# Patient Record
Sex: FEMALE | Race: WHITE | NOT HISPANIC OR LATINO | URBAN - METROPOLITAN AREA
[De-identification: names, ages, dates, MRNs, and addresses within clinical notes are randomized per-mention and may not be internally consistent; named-entity substitution may affect disease eponyms.]

---

## 2018-08-24 ENCOUNTER — HOME CARE/HOSPICE - HEALTHEAST (OUTPATIENT)
Dept: HOME HEALTH SERVICES | Facility: HOME HEALTH | Age: 58
End: 2018-08-24

## 2018-09-14 ENCOUNTER — AMBULATORY - HEALTHEAST (OUTPATIENT)
Dept: CARDIOLOGY | Facility: CLINIC | Age: 58
End: 2018-09-14

## 2018-09-14 ENCOUNTER — RECORDS - HEALTHEAST (OUTPATIENT)
Dept: ADMINISTRATIVE | Facility: OTHER | Age: 58
End: 2018-09-14

## 2018-09-21 ENCOUNTER — OFFICE VISIT - HEALTHEAST (OUTPATIENT)
Dept: CARDIOLOGY | Facility: CLINIC | Age: 58
End: 2018-09-21

## 2018-09-21 DIAGNOSIS — I25.118 CORONARY ARTERY DISEASE OF NATIVE ARTERY OF NATIVE HEART WITH STABLE ANGINA PECTORIS (H): ICD-10-CM

## 2018-09-21 DIAGNOSIS — I65.22 OCCLUSION AND STENOSIS OF LEFT CAROTID ARTERY: ICD-10-CM

## 2018-09-21 DIAGNOSIS — I67.9 CEREBROVASCULAR DISEASE: ICD-10-CM

## 2018-09-21 RX ORDER — BACLOFEN 10 MG/1
5-10 TABLET ORAL 2 TIMES DAILY PRN
Status: SHIPPED | COMMUNITY
Start: 2018-09-21

## 2018-09-21 ASSESSMENT — MIFFLIN-ST. JEOR: SCORE: 1063.03

## 2018-10-05 ENCOUNTER — COMMUNICATION - HEALTHEAST (OUTPATIENT)
Dept: VASCULAR SURGERY | Facility: CLINIC | Age: 58
End: 2018-10-05

## 2018-10-31 ENCOUNTER — COMMUNICATION - HEALTHEAST (OUTPATIENT)
Dept: CARDIOLOGY | Facility: CLINIC | Age: 58
End: 2018-10-31

## 2018-11-01 ENCOUNTER — HOSPITAL ENCOUNTER (OUTPATIENT)
Dept: CARDIOLOGY | Facility: CLINIC | Age: 58
Discharge: HOME | End: 2018-11-01
Attending: INTERNAL MEDICINE

## 2018-11-07 ENCOUNTER — RECORDS - HEALTHEAST (OUTPATIENT)
Dept: VASCULAR ULTRASOUND | Facility: CLINIC | Age: 58
End: 2018-11-07

## 2018-11-07 DIAGNOSIS — I65.22 OCCLUSION AND STENOSIS OF LEFT CAROTID ARTERY: ICD-10-CM

## 2018-11-07 DIAGNOSIS — I67.9 CEREBROVASCULAR DISEASE, UNSPECIFIED: ICD-10-CM

## 2019-01-09 ENCOUNTER — COMMUNICATION - HEALTHEAST (OUTPATIENT)
Dept: CARDIOLOGY | Facility: CLINIC | Age: 59
End: 2019-01-09

## 2019-01-09 DIAGNOSIS — R07.9 CHEST PAIN, UNSPECIFIED: ICD-10-CM

## 2019-01-23 ENCOUNTER — HOSPITAL ENCOUNTER (OUTPATIENT)
Dept: CARDIOLOGY | Facility: CLINIC | Age: 59
Discharge: HOME | End: 2019-01-23
Attending: INTERNAL MEDICINE

## 2019-01-23 ENCOUNTER — HOSPITAL ENCOUNTER (OUTPATIENT)
Dept: NUCLEAR MEDICINE | Facility: CLINIC | Age: 59
Discharge: HOME | End: 2019-01-23
Attending: INTERNAL MEDICINE

## 2019-01-23 DIAGNOSIS — I25.118 CORONARY ARTERY DISEASE OF NATIVE ARTERY OF NATIVE HEART WITH STABLE ANGINA PECTORIS (H): ICD-10-CM

## 2019-02-07 ENCOUNTER — COMMUNICATION - HEALTHEAST (OUTPATIENT)
Dept: ADMINISTRATIVE | Facility: CLINIC | Age: 59
End: 2019-02-07

## 2019-02-21 ENCOUNTER — COMMUNICATION - HEALTHEAST (OUTPATIENT)
Dept: CARDIOLOGY | Facility: CLINIC | Age: 59
End: 2019-02-21

## 2019-04-02 ENCOUNTER — COMMUNICATION - HEALTHEAST (OUTPATIENT)
Dept: CARDIOLOGY | Facility: CLINIC | Age: 59
End: 2019-04-02

## 2019-04-02 DIAGNOSIS — R07.1 CHEST PAIN ON BREATHING: ICD-10-CM

## 2019-04-02 DIAGNOSIS — I25.10 CAD (CORONARY ARTERY DISEASE): ICD-10-CM

## 2019-04-23 ENCOUNTER — HOSPITAL ENCOUNTER (OUTPATIENT)
Dept: CARDIOLOGY | Facility: HOSPITAL | Age: 59
Discharge: HOME | End: 2019-04-23
Attending: INTERNAL MEDICINE

## 2019-04-23 DIAGNOSIS — R07.1 CHEST PAIN ON BREATHING: ICD-10-CM

## 2019-04-23 DIAGNOSIS — I25.10 CAD (CORONARY ARTERY DISEASE): ICD-10-CM

## 2019-04-23 LAB
CV STRESS CURRENT BP HE: NORMAL
CV STRESS CURRENT HR HE: 102
CV STRESS CURRENT HR HE: 110
CV STRESS CURRENT HR HE: 110
CV STRESS CURRENT HR HE: 112
CV STRESS CURRENT HR HE: 114
CV STRESS CURRENT HR HE: 115
CV STRESS CURRENT HR HE: 116
CV STRESS CURRENT HR HE: 76
CV STRESS CURRENT HR HE: 78
CV STRESS CURRENT HR HE: 79
CV STRESS CURRENT HR HE: 79
CV STRESS CURRENT HR HE: 80
CV STRESS CURRENT HR HE: 83
CV STRESS CURRENT HR HE: 85
CV STRESS CURRENT HR HE: 86
CV STRESS CURRENT HR HE: 86
CV STRESS CURRENT HR HE: 91
CV STRESS CURRENT HR HE: 92
CV STRESS DEVIATION TIME HE: NORMAL
CV STRESS ECHO PERCENT HR HE: NORMAL
CV STRESS EXERCISE STAGE HE: NORMAL
CV STRESS FINAL RESTING BP HE: NORMAL
CV STRESS FINAL RESTING HR HE: 79
CV STRESS MAX HR HE: 117
CV STRESS MAX TREADMILL GRADE HE: 12
CV STRESS MAX TREADMILL SPEED HE: 2.5
CV STRESS PEAK DIA BP HE: NORMAL
CV STRESS PEAK SYS BP HE: NORMAL
CV STRESS PHASE HE: NORMAL
CV STRESS PROTOCOL HE: NORMAL
CV STRESS RESTING PT POSITION HE: NORMAL
CV STRESS RESTING PT POSITION HE: NORMAL
CV STRESS ST DEVIATION AMOUNT HE: NORMAL
CV STRESS ST DEVIATION ELEVATION HE: NORMAL
CV STRESS ST EVELATION AMOUNT HE: NORMAL
CV STRESS TEST TYPE HE: NORMAL
CV STRESS TOTAL STAGE TIME MIN 1 HE: NORMAL
STRESS ECHO BASELINE BP: NORMAL
STRESS ECHO BASELINE HR: 79
STRESS ECHO CALCULATED PERCENT HR: 72 %
STRESS ECHO LAST STRESS BP: NORMAL
STRESS ECHO LAST STRESS HR: 114
STRESS ECHO POST ESTIMATED WORKLOAD: 7
STRESS ECHO POST EXERCISE DUR MIN: 5
STRESS ECHO POST EXERCISE DUR SEC: 0
STRESS ECHO TARGET HR: 138

## 2019-06-10 ENCOUNTER — COMMUNICATION - HEALTHEAST (OUTPATIENT)
Dept: CARDIOLOGY | Facility: CLINIC | Age: 59
End: 2019-06-10

## 2019-07-12 ENCOUNTER — ANESTHESIA - HEALTHEAST (OUTPATIENT)
Dept: SURGERY | Facility: HOSPITAL | Age: 59
End: 2019-07-12

## 2019-07-12 ENCOUNTER — SURGERY - HEALTHEAST (OUTPATIENT)
Dept: SURGERY | Facility: HOSPITAL | Age: 59
End: 2019-07-12

## 2019-07-12 ASSESSMENT — MIFFLIN-ST. JEOR: SCORE: 1049.42

## 2021-04-08 ENCOUNTER — RECORDS - HEALTHEAST (OUTPATIENT)
Dept: ADMINISTRATIVE | Facility: OTHER | Age: 61
End: 2021-04-08

## 2021-04-29 ENCOUNTER — HOSPITAL ENCOUNTER (OUTPATIENT)
Dept: CT IMAGING | Facility: HOSPITAL | Age: 61
Discharge: HOME | End: 2021-04-29

## 2021-04-29 DIAGNOSIS — R31.29 MICROSCOPIC HEMATURIA: ICD-10-CM

## 2021-04-29 LAB
CREAT BLD-MCNC: 0.6 MG/DL (ref 0.6–1.1)
GFR SERPL CREATININE-BSD FRML MDRD: >60 ML/MIN/1.73M2

## 2021-05-12 ENCOUNTER — TRANSFERRED RECORDS (OUTPATIENT)
Dept: HEALTH INFORMATION MANAGEMENT | Facility: CLINIC | Age: 61
End: 2021-05-12

## 2021-05-24 ENCOUNTER — RECORDS - HEALTHEAST (OUTPATIENT)
Dept: ADMINISTRATIVE | Facility: CLINIC | Age: 61
End: 2021-05-24

## 2021-05-27 ENCOUNTER — RECORDS - HEALTHEAST (OUTPATIENT)
Dept: ADMINISTRATIVE | Facility: CLINIC | Age: 61
End: 2021-05-27

## 2021-05-27 NOTE — TELEPHONE ENCOUNTER
Noted. Will inquire with [facility].      Dr. Fabian,  Pt you saw once in September 2018 and had ordered a nuclear stress test and echocardiogram- does not look like either were completed. There have been a few calls regarding the nuclear stress test and her needing it prior to colonoscopy- rescheduling and re-ordering and not wanting to go through with it. The patient's  is calling in again inquiring about an alternative. Would you like an alternative or at this point, should I defer to primary care doctor?  Thank you,  Mal

## 2021-05-27 NOTE — TELEPHONE ENCOUNTER
Called back Don. Informed him that since he is not on consent to communicate, he would need to call back with Cynthia present to get her permission to discuss medical information. He has my direct line and will call back. -[facility]

## 2021-05-27 NOTE — TELEPHONE ENCOUNTER
----- Message from Brooklyn Tinoco sent at 4/2/2019 12:10 PM CDT -----  Contact: pts spouse don  General phone call:    Caller: [facility]  Primary cardiologist: dr hillman  Detailed reason for call: pt possibly has cancer and they want her to have a colonoscopy. They wont do the colonoscopy until she gets a stress test, but pt does not want a nuc stress. Wants to know if there is another optoin  New or active symptoms? n/a  Best phone number: [phone]  Best time to contact: any  Ok to leave a detailed message? yes  Device? no    Additional Info:

## 2021-05-27 NOTE — TELEPHONE ENCOUNTER
Called patient and LM updating that a stress echo had been ordered and that she can call scheduling to arrange. -[facility]

## 2021-05-29 NOTE — TELEPHONE ENCOUNTER
She had normal stress echo.  She can undergo surgery from my point of view.  If they need current cardiology note, she needs to be seen in [facility].

## 2021-05-29 NOTE — TELEPHONE ENCOUNTER
"Noted patient was last seen by Dr. Fabian 9-21-18 - nuc stress test and echo ordered - testing had since been postponed by patient and/or daughter Laine per multiple phone notes - patient was due for 6mo f/u 3/2019 which has not been scheduled.    Please review update and address PCP request for \"cardiac clearance\".  Will you provide letter or should patient be seen in [facility] if you do not have appts available?  mg  "

## 2021-05-29 NOTE — TELEPHONE ENCOUNTER
Return call to PCP clinic - informed Sallie of Dr. Fabian's response to request for letter - Sallie verbalized understanding and provided scheduling # if PCP recommends referral to [facility].  mg

## 2021-05-29 NOTE — TELEPHONE ENCOUNTER
----- Message from Anitha Levin sent at 6/7/2019  3:26 PM CDT -----  Contact: Sallie @ [facility]  General phone call:    Caller:   Primary cardiologist: Caren  Detailed reason for call: Pt needs cardiac clearance- PMD Dr. Roberts asking if they can use Stress Echo results from 4/23 as clearance- get a letter from [facility]?  New or active symptoms?   Best phone number: Sallie [phone]  [facility]  Best time to contact:   Ok to leave a detailed message?   Device?     Additional Info:

## 2021-05-30 NOTE — ANESTHESIA CARE TRANSFER NOTE
Last vitals:   Vitals:    07/12/19 1028   BP: 123/66   Pulse: 64   Resp: 14   Temp: 36  C (96.8  F)   SpO2: 99%     Patient's level of consciousness is drowsy  Spontaneous respirations: yes  Maintains airway independently: yes  Dentition unchanged: yes  Oropharynx: oropharynx clear of all foreign objects    QCDR Measures:  ASA# 20 - Surgical Safety Checklist: WHO surgical safety checklist completed prior to induction    PQRS# 430 - Adult PONV Prevention: 4558F - Pt received => 2 anti-emetic agents (different classes) preop & intraop  ASA# 8 - Peds PONV Prevention: NA - Not pediatric patient, not GA or 2 or more risk factors NOT present  PQRS# 424 - Krystal-op Temp Management: 4559F - At least one body temp DOCUMENTED => 35.5C or 95.9F within required timeframe  PQRS# 426 - PACU Transfer Protocol: - Transfer of care checklist used  ASA# 14 - Acute Post-op Pain:pain >5

## 2021-05-30 NOTE — ANESTHESIA POSTPROCEDURE EVALUATION
Patient: Cynthia Cheng  ENDOSCOPIC ULTRASOUND, FINE NEEDLE ASPIRATION OF PANCREATIC BODY LESION  Anesthesia type: MAC    Patient location: Phase II Recovery  Last vitals:   Vitals Value Taken Time   /69 7/12/2019 11:30 AM   Temp 36  C (96.8  F) 7/12/2019 10:28 AM   Pulse 57 7/12/2019 11:34 AM   Resp 16 7/12/2019 11:34 AM   SpO2 94 % 7/12/2019 11:34 AM   Vitals shown include unvalidated device data.  Post vital signs: stable  Level of consciousness: awake and responds to simple questions  Post-anesthesia pain: pain controlled  Post-anesthesia nausea and vomiting: no  Pulmonary: unassisted, return to baseline  Cardiovascular: stable and blood pressure at baseline  Hydration: adequate  Anesthetic events: no    QCDR Measures:  ASA# 11 - Krystal-op Cardiac Arrest: ASA11B - Patient did NOT experience unanticipated cardiac arrest  ASA# 12 - Krystal-op Mortality Rate: ASA12B - Patient did NOT die  ASA# 13 - PACU Re-Intubation Rate: ASA13B - Patient did NOT require a new airway mgmt  ASA# 10 - Composite Anes Safety: ASA10A - No serious adverse event    Additional Notes:

## 2021-05-30 NOTE — ANESTHESIA PREPROCEDURE EVALUATION
Anesthesia Evaluation      Patient summary reviewed   No history of anesthetic complications     Airway   Mallampati: II  Neck ROM: full   Pulmonary - normal exam    breath sounds clear to auscultation  (+) asthma  a smoker (current every day smoker)                         Cardiovascular - normal exam  Exercise tolerance: > or = 4 METS  (+) hypertension, past MI, CAD, angina, CHF, , hypercholesterolemia,     ECG reviewed (7/9/19: NSR)  Rhythm: regular  Rate: normal,      ROS comment: Echo stress exercise 4/23/19:  1. Normal stress echocardiogram without evidence of stress induced ischemia, but with reduced sensitivity due to inability to obtain target heart rate (72% maximal heart rate achieved).   2. Normal resting LV systolic performance with an ejection fraction of 55%. There is normal improvement in left ventricular systolic performance with a peak ejection fraction of 70%.  3. No ECG evidence of ischemia.  4. Patient reported 1/10 intensity chest discomfort with exercise.  5. Fair to average functional capacity for age.     Neuro/Psych    (+) CVA , depression, anxiety/panic attacks,     Endo/Other    (+) diabetes mellitus type 2, hypothyroidism,   (-) not pregnant     GI/Hepatic/Renal      Comments: Pancreatic cyst            Dental - normal exam                        Anesthesia Plan  Planned anesthetic: MAC  Plan for propofol gtt w/ ketamine / fentanyl PRN for pain.  Discussed potential need to convert to GA w/ ETT vs LMA if not tolerating.  Explained that it is possible to remember certain aspects of the OR experience during MAC dependent on the depth of sedation and patient understands this.  Decadron and zofran for PONV ppx.  ASA 3     Anesthetic plan and risks discussed with: patient and spouse  Anesthesia plan special considerations: antiemetics,   Post-op plan: routine recovery

## 2021-06-02 ENCOUNTER — RECORDS - HEALTHEAST (OUTPATIENT)
Dept: ADMINISTRATIVE | Facility: CLINIC | Age: 61
End: 2021-06-02

## 2021-06-02 VITALS — HEIGHT: 60 IN | BODY MASS INDEX: 24.74 KG/M2 | WEIGHT: 126 LBS

## 2021-06-03 VITALS — WEIGHT: 123 LBS | BODY MASS INDEX: 24.15 KG/M2 | HEIGHT: 60 IN

## 2021-06-16 PROBLEM — I25.118 CORONARY ARTERY DISEASE OF NATIVE ARTERY OF NATIVE HEART WITH STABLE ANGINA PECTORIS (H): Status: ACTIVE | Noted: 2018-09-21

## 2021-06-16 PROBLEM — I67.9 CEREBROVASCULAR DISEASE: Status: ACTIVE | Noted: 2018-09-21

## 2021-06-16 PROBLEM — K85.90 ACUTE PANCREATITIS: Status: ACTIVE | Noted: 2018-06-20

## 2021-06-16 PROBLEM — R10.84 ABDOMINAL PAIN, GENERALIZED: Status: ACTIVE | Noted: 2018-08-23

## 2021-06-18 NOTE — LETTER
Letter by Kishan Fabian MD (Ted) at      Author: Kishan Fabian MD (Ted) Service: -- Author Type: --    Filed:  Encounter Date: 2/7/2019 Status: (Other)       Cynthia Cheng  [address] [place] [address]      February 7, 2019      Dear Cynthia,    This letter is to remind you that you will be due for your follow up appointment with Dr. Luis Fabian  . To help ensure you are in the best health possible, a regular follow-up with your cardiologist is essential.     Please call our Patient Scheduling Line at [phone] to schedule your appointment at your earliest convenience.  If you have recently scheduled an appointment, please disregard this letter.    We look forward to seeing you again. As always, we are available at the number  above for any questions or concerns you may have.      Sincerely,     The Physicians and Staff of [facility] [place]

## 2021-06-22 NOTE — TELEPHONE ENCOUNTER
----- Message from Steve Florian sent at 1/9/2019 12:47 PM CST -----  Contact: Pramod (Spouse)  General phone call:    Caller: Pramod   Primary cardiologist: Dr. Fabian  Detailed reason for call: Patient went in for a colonoscopy and they would not do the procedure due to her abnormal EKG. They are wanting to know what they should do, and if they need further testing, it looks as though the patient no-showed numerous tests in November  New or active symptoms? Active  Best phone number: [phone]  Best time to contact: Anytime  Ok to leave a detailed message? yes  Device? no    Additional Info:     
Called patient with Dr. Fabian's recommendations. Patient transferred to schedulers to arrange procedure.   
Pt returned call to discuss concerns. Patient needs a NM stress test completed prior to having a colonoscopy/endoscopy. Patient states that her CEA levels are elevated and she will need to have exploratory procedures done to find possible cancer. Patient was previously scheduled for a NM stress test 11/1/2018 however her daughter canceled the test due to concerns with her CEA levels.     Dr. Fabian, OK to place another order for NM stress test? Any other recommendations?  
Returned Don's call explained to him that I am not able to discuss patient with him because we do not have a consent to communicate form. Advised him to call back with patient to discuss further. Pramod verbalized understanding and was given my direct line to call back later.  
Set her up for Lexiscan nuclear perfusion test  
no

## 2021-06-23 NOTE — PROGRESS NOTES
NM Pharmacologic Stress Test:  Pt stated having caffeine early this am.  Unable to do test today.  Pt became very upset when offered to reschedule.  Pt refused to reschedule.  Will notify Dr. Fabian.  No test scheduled for pt at this time.

## 2021-06-24 NOTE — TELEPHONE ENCOUNTER
"Returned daughter, Laine's call to discuss concerns. Laine shares that pt does not wish to proceed with stress test and that she wishes to go forward with a  \"less invasive option\" she calls to schedule an EKG that was ordered by her PCP who is outside the [facility] system. Advised daughter to call PCP office to schedule ECG so that PCP would have access to the report upon completion.  Informed her to call back if there are any further concerns that would need to be addressed by [facility].  "

## 2021-06-24 NOTE — TELEPHONE ENCOUNTER
----- Message from Jailyn Dove sent at 2/21/2019  2:15 PM CST -----  Contact: DAUGHTER, VANNESA  General phone call:  CALLING TO DISCUSS OPTIONS OF STRESS TEST, WANTS TO TALK ABOUT MEDICATIONS, PLEASE CALL  Caller: VANNESA  Primary cardiologist: DR DRUMMOND  Detailed reason for call: SEE ABOVE  New or active symptoms? NO  Best phone number: [phone]  Best time to contact: ANY TIME  Ok to leave a detailed message? YES  Device? NO    Additional Info:

## 2021-06-26 NOTE — PROGRESS NOTES
Progress Notes by Kishan Fabian MD (Ted) at 9/21/2018 10:10 AM     Author: Kishan Fabian MD (Ted) Service: -- Author Type: Physician    Filed: 9/21/2018 10:37 AM Encounter Date: 9/21/2018 Status: Signed    : Kishan Fabian MD (Ted) (Physician)           Click to link to [facility] HEART CARE NOTE    Thank you, Dr. Roberts, for asking the [facility] to evaluate Ms. Cynthia Cheng.      Assessment/Recommendations   Assessment:    Coronary artery disease with history of RCA stenting, most recent intervention in 2013  Chest pains, likely angina/progression of coronary artery disease  Cerebrovascular disease with history of CVA and left carotid endarterectomy in 2011  Diabetes mellitus  Hypertension controlled  Hypercholesterolemia on low-dose statin  History of substance use  Chronic tobacco user    Plan:  Stress test  Echo to reassess LV function    I stressed importance of tobacco cessation    Continue current medications until results stress test and echo are available.       History of Present Illness    Ms. Cynthia Cheng is a 57 y.o. female who comes in for cardiac consultation.  She has a history of coronary artery disease.  She has had stenting of RCA on 2 separate occasions.  Most recent intervention to distal RCA was performed in 2013.  She had had mild to moderate disease elsewhere.  She has not had follow-up with cardiologist since that time.  She reports occasional chest pains.  Those pains are both exertional and related to emotional upset.  The pain is located in the left side of her chest.  It tends to be sharp.  There is no pleuritic features or radiation.  She denies weight gain, PND, orthopnea.  She has not had syncope.  She has no history of valvular heart disease or cardiomyopathy.    ECG: Personally reviewed.  August 2018 normal sinus rhythm within normal limits       Physical Examination Review of Systems    Vitals:    09/21/18 0958   BP: 114/68   Pulse: 84   Resp: 16     Body mass index is 24.61 kg/(m^2).  Wt Readings from Last 3 Encounters:   09/21/18 126 lb (57.2 kg)   08/27/18 136 lb 1.6 oz (61.7 kg)   06/19/18 130 lb 14.4 oz (59.4 kg)     General Appearance:   Alert, cooperative, no distress, appears stated age   Head/ENT: Normocephalic, without obvious abnormality. Membranes moist      EYES:  no scleral icterus, normal conjunctivae   Neck: Supple, symmetrical, trachea midline, no adenopathy, thyroid: not enlarged, symmetric, no carotid bruit or JVD   Chest/Lungs:   Lungs are clear to auscultation, respirations unlabored. No tenderness or deformity    Cardiovascular:   Regular rhythm, S1, S2 normal, no murmur, rub or gallop.   Abdomen:  Soft, non-tender, bowel sounds active all four quadrants,  no masses, no organomegaly   Extremities: no cyanosis or clubbing. No edema   Skin: Skin color, texture, turgor normal, no rashes or lesions.    Psychiatric: Normal affect, calm   Neurologic: Alert and oriented x 3, moving all four extremities.     General: Fever, Night Sweats, Weight Loss  Eyes: Visual Distubance  Ears/Nose/Throat: WNL  Lungs: Cough, Shortness of Breath, Wheezing  Heart: Chest Pain, Arm Pain, Shortness of Breath with activity, Irregular Heartbeat, Leg Swelling  Stomach: Constipation, Diarrhea, Nausea  Bladder: Frequent Urination at Night  Muscle/Joints: Joint Pain, Muscle Weakness, Muscle Pain  Skin: WNL  Nervous System: WNL  Mental Health: WNL     Blood: WNL     Medical History  Surgical History Family History Social History   Past Medical History:   Diagnosis Date   ? Acute pancreatitis    ? Alcohol abuse    ? Asthma    ? Cerebral infarction (H)     left frontal lobe   ? CHF (congestive heart failure) (H)    ? Coronary artery disease    ? Diabetes mellitus (H)    ? Disease of thyroid gland    ? Drug overdose, intentional self-harm, sequela (H)    ? Hyperlipidemia    ? Hypertension    ? Methamphetamine  "abuse    ? NSTEMI (non-ST elevated myocardial infarction) (H)    ? T2DM (type 2 diabetes mellitus) (H)     Past Surgical History:   Procedure Laterality Date   ? OK THROMBOENDARTECTMY NECK,NECK INCIS      Description: Neurological Surgery Carotid Endarterectomy;  Recorded: 12/19/2012;    no family history of premature coronary artery disease Social History     Social History   ? Marital status:      Spouse name: N/A   ? Number of children: N/A   ? Years of education: N/A     Occupational History   ? Not on file.     Social History Main Topics   ? Smoking status: Current Every Day Smoker     Packs/day: 0.50     Years: 45.00   ? Smokeless tobacco: Never Used      Comment: tried in the past for couple months but \"too hard\"   ? Alcohol use 6.0 oz/week     10 Cans of beer per week      Comment: states had 10 beers last night.   ? Drug use: No   ? Sexual activity: Yes     Partners: Male     Other Topics Concern   ? Not on file     Social History Narrative          Medications  Allergies   Current Outpatient Prescriptions   Medication Sig Dispense Refill   ? albuterol (PROVENTIL HFA;VENTOLIN HFA) 90 mcg/actuation inhaler Inhale 1-2 puffs every 4 (four) hours as needed for wheezing.      ? amLODIPine (NORVASC) 2.5 MG tablet Take 2.5 mg by mouth daily.     ? aspirin 81 MG EC tablet Take 81 mg by mouth daily.     ? atorvastatin (LIPITOR) 10 MG tablet Take 10 mg by mouth at bedtime.     ? baclofen (LIORESAL) 10 MG tablet Take 5-10 mg by mouth 2 (two) times a day as needed.     ? fluticasone (FLOVENT HFA) 110 mcg/actuation inhaler Inhale 1 puff 2 (two) times a day.     ? hydrALAZINE (APRESOLINE) 10 MG tablet Take 10 mg by mouth 3 (three) times a day as needed (for SBP above 160 or DBP > 110).      ? hydrOXYzine HCl (ATARAX) 25 MG tablet Take 12.5-25 mg by mouth every 12 (twelve) hours as needed for anxiety.     ? levothyroxine (SYNTHROID, LEVOTHROID) 125 MCG tablet Take 125 mcg by mouth Daily at 6:00 am.     ? lisinopril " (PRINIVIL,ZESTRIL) 40 MG tablet Take 40 mg by mouth daily.     ? metFORMIN (GLUCOPHAGE-XR) 500 MG 24 hr tablet Take 1 tablet (500 mg total) by mouth daily with lunch. (Patient taking differently: Take 500 mg by mouth 2 (two) times a day. )  0   ? oxyCODONE (ROXICODONE) 5 MG immediate release tablet Take 1 tablet (5 mg total) by mouth every 6 (six) hours as needed. 13 tablet 0     No current facility-administered medications for this visit.       Allergies   Allergen Reactions   ? Erythromycin Base Rash   ? Sulfa (Sulfonamide Antibiotics) Rash         Lab Results    Chemistry/lipid CBC Cardiac Enzymes/BNP/TSH/INR   Lab Results   Component Value Date    CHOL 162 06/20/2018    HDL 33 (L) 06/20/2018    LDLCALC 87 06/20/2018    TRIG 92 08/22/2018    CREATININE 0.55 (L) 08/25/2018    BUN 4 (L) 08/25/2018    K 3.5 08/27/2018     08/25/2018     08/25/2018    CO2 27 08/25/2018    Lab Results   Component Value Date    WBC 8.1 08/25/2018    HGB 10.9 (L) 08/25/2018    HCT 31.2 (L) 08/25/2018    MCV 92 08/25/2018     08/25/2018    Lab Results   Component Value Date    CKTOTAL 162 06/23/2013    CKMB 2 11/26/2011    TROPONINI <0.01 06/19/2018    TSH 1.88 08/22/2018    INR 0.89 (L) 08/22/2018

## 2021-07-03 ENCOUNTER — HEALTH MAINTENANCE LETTER (OUTPATIENT)
Age: 61
End: 2021-07-03

## 2021-10-23 ENCOUNTER — HEALTH MAINTENANCE LETTER (OUTPATIENT)
Age: 61
End: 2021-10-23

## 2022-02-12 ENCOUNTER — HEALTH MAINTENANCE LETTER (OUTPATIENT)
Age: 62
End: 2022-02-12

## 2022-07-30 ENCOUNTER — HEALTH MAINTENANCE LETTER (OUTPATIENT)
Age: 62
End: 2022-07-30

## 2022-10-10 ENCOUNTER — HEALTH MAINTENANCE LETTER (OUTPATIENT)
Age: 62
End: 2022-10-10

## 2023-05-27 ENCOUNTER — HEALTH MAINTENANCE LETTER (OUTPATIENT)
Age: 63
End: 2023-05-27

## 2023-08-19 ENCOUNTER — HEALTH MAINTENANCE LETTER (OUTPATIENT)
Age: 63
End: 2023-08-19

## 2024-01-06 ENCOUNTER — HEALTH MAINTENANCE LETTER (OUTPATIENT)
Age: 64
End: 2024-01-06